# Patient Record
Sex: FEMALE | Race: WHITE | URBAN - METROPOLITAN AREA
[De-identification: names, ages, dates, MRNs, and addresses within clinical notes are randomized per-mention and may not be internally consistent; named-entity substitution may affect disease eponyms.]

---

## 2020-11-18 ENCOUNTER — OFFICE VISIT (OUTPATIENT)
Dept: URBAN - METROPOLITAN AREA CLINIC 11 | Facility: CLINIC | Age: 65
End: 2020-11-18
Payer: COMMERCIAL

## 2020-11-18 DIAGNOSIS — H25.13 AGE-RELATED NUCLEAR CATARACT, BILATERAL: ICD-10-CM

## 2020-11-18 DIAGNOSIS — E11.9 TYPE 2 DIABETES MELLITUS W/O COMPLICATION: ICD-10-CM

## 2020-11-18 DIAGNOSIS — H16.8 OTHER KERATITIS: Primary | ICD-10-CM

## 2020-11-18 PROCEDURE — 92004 COMPRE OPH EXAM NEW PT 1/>: CPT | Performed by: OPTOMETRIST

## 2020-11-18 ASSESSMENT — KERATOMETRY
OS: 41.88
OD: 42.50

## 2020-11-18 ASSESSMENT — INTRAOCULAR PRESSURE
OS: 15
OD: 15

## 2020-11-18 NOTE — IMPRESSION/PLAN
Impression: Type 2 diabetes mellitus w/o complication: O90.1. Plan: No signs of retinopathy or neovascularization noted. Discussed ocular and systemic benefits of blood sugar control. Continue blood sugar control.  RTC 1yr complete exam

## 2020-11-18 NOTE — IMPRESSION/PLAN
Impression: Age-related nuclear cataract, bilateral: H25.13. Plan: Discussed diagnosis in detail with patient. No treatment is required at this time. Will continue to observe condition and or symptoms. Call if [address] worsens.

## 2020-11-25 ENCOUNTER — OFFICE VISIT (OUTPATIENT)
Dept: URBAN - METROPOLITAN AREA CLINIC 11 | Facility: CLINIC | Age: 65
End: 2020-11-25
Payer: COMMERCIAL

## 2020-11-25 PROCEDURE — 99213 OFFICE O/P EST LOW 20 MIN: CPT | Performed by: OPTOMETRIST

## 2020-11-25 ASSESSMENT — INTRAOCULAR PRESSURE
OS: 15
OD: 15

## 2020-11-25 NOTE — IMPRESSION/PLAN
Impression: Other keratitis: H16.8. Right. Condition: improving. Plan: D/C Tobradex.  Start Rx Lotemax 1gtt QID OD x 1 wk, TID x 1 wk, BID  x 1 wk, QD x 1 wk.(sample dispensed today)  f/u 3-4 weeks

## 2020-12-23 ENCOUNTER — OFFICE VISIT (OUTPATIENT)
Dept: URBAN - METROPOLITAN AREA CLINIC 11 | Facility: CLINIC | Age: 65
End: 2020-12-23
Payer: COMMERCIAL

## 2020-12-23 PROCEDURE — 99213 OFFICE O/P EST LOW 20 MIN: CPT | Performed by: OPTOMETRIST

## 2020-12-23 ASSESSMENT — INTRAOCULAR PRESSURE
OD: 12
OS: 12

## 2020-12-23 NOTE — IMPRESSION/PLAN
Impression: Other keratitis: H16.8. Right. Condition: improving. Plan: finished Lotemax. Recommend art tears BID OU.  RTC 1 year DE CARMINE

## 2022-01-27 ENCOUNTER — OFFICE VISIT (OUTPATIENT)
Dept: URBAN - METROPOLITAN AREA CLINIC 11 | Facility: CLINIC | Age: 67
End: 2022-01-27
Payer: COMMERCIAL

## 2022-01-27 DIAGNOSIS — H40.033 ANATOMICAL NARROW ANGLE, BILATERAL: ICD-10-CM

## 2022-01-27 PROCEDURE — 92250 FUNDUS PHOTOGRAPHY W/I&R: CPT | Performed by: OPTOMETRIST

## 2022-01-27 PROCEDURE — 99214 OFFICE O/P EST MOD 30 MIN: CPT | Performed by: OPTOMETRIST

## 2022-01-27 ASSESSMENT — KERATOMETRY
OD: 41.75
OS: 42.13

## 2022-01-27 ASSESSMENT — INTRAOCULAR PRESSURE
OD: 14
OS: 14

## 2022-01-27 ASSESSMENT — VISUAL ACUITY
OD: 20/30
OS: 20/30

## 2022-01-27 NOTE — IMPRESSION/PLAN
Impression: Type 2 diabetes mellitus w/o complication: D55.8. Plan: Continue BS control. Pt not dilated today because of risk of angle closure.  RTC in 3mns for dilation after PI.

## 2022-01-27 NOTE — IMPRESSION/PLAN
Impression: Anatomical narrow angle, bilateral: H40.033. Plan: Photos of [address] 73 and performed today OU. Angles appear occludable OU.  Refer to MD for PI jdal. s/s of angle closure call office or go to ER

## 2022-01-27 NOTE — IMPRESSION/PLAN
Impression: Age-related nuclear cataract, bilateral: H25.13. Plan: Recommend Cataract evaluation after LPI OU. 
RTC in 3 months for dilation after PI.

## 2022-04-14 ENCOUNTER — OFFICE VISIT (OUTPATIENT)
Dept: URBAN - METROPOLITAN AREA CLINIC 45 | Facility: CLINIC | Age: 67
End: 2022-04-14
Payer: COMMERCIAL

## 2022-04-14 DIAGNOSIS — H40.033 ANATOMICAL NARROW ANGLE, BILATERAL: Primary | ICD-10-CM

## 2022-04-14 DIAGNOSIS — H25.13 AGE-RELATED NUCLEAR CATARACT, BILATERAL: ICD-10-CM

## 2022-04-14 PROCEDURE — 92133 CPTRZD OPH DX IMG PST SGM ON: CPT | Performed by: OPHTHALMOLOGY

## 2022-04-14 PROCEDURE — 99204 OFFICE O/P NEW MOD 45 MIN: CPT | Performed by: OPHTHALMOLOGY

## 2022-04-14 PROCEDURE — 92020 GONIOSCOPY: CPT | Performed by: OPHTHALMOLOGY

## 2022-04-14 PROCEDURE — 76514 ECHO EXAM OF EYE THICKNESS: CPT | Performed by: OPHTHALMOLOGY

## 2022-04-14 RX ORDER — PREDNISOLONE ACETATE 10 MG/ML
1 % SUSPENSION/ DROPS OPHTHALMIC
Qty: 1 | Refills: 0 | Status: ACTIVE
Start: 2022-04-14

## 2022-04-14 NOTE — IMPRESSION/PLAN
Impression: Anatomical narrow angle, bilateral: H40.033. /554, 4/22 OCT 91/95 9/10, GCC 78/79 Plan: ok for LPI OU in [address] 26, RL2. Discussed narrow angles, risk of angle closure, symptoms of AACG and Laser peripheral iridotomy (LPI) risk/benefits/alternatives. Discussed that pt should avoid dilation and anti-depression, anti-anxiety, anti-histamine, or other medications contraindicated in angle closure glaucoma until the laser has been performed. Discussed risk of irreversible vision loss with glaucoma. Pt voiced understanding.

## 2022-05-25 ENCOUNTER — SURGERY (OUTPATIENT)
Dept: URBAN - METROPOLITAN AREA SURGERY 20 | Facility: SURGERY | Age: 67
End: 2022-05-25
Payer: COMMERCIAL

## 2022-06-01 ENCOUNTER — POST-OPERATIVE VISIT (OUTPATIENT)
Dept: URBAN - METROPOLITAN AREA CLINIC 45 | Facility: CLINIC | Age: 67
End: 2022-06-01
Payer: COMMERCIAL

## 2022-06-01 DIAGNOSIS — Z48.810 ENCOUNTER FOR SURGICAL AFTERCARE FOLLOWING SURGERY ON A SENSE ORGAN: Primary | ICD-10-CM

## 2022-06-01 PROCEDURE — 99024 POSTOP FOLLOW-UP VISIT: CPT | Performed by: OPTOMETRIST

## 2022-06-01 ASSESSMENT — INTRAOCULAR PRESSURE
OD: 18
OS: 20

## 2022-06-01 NOTE — IMPRESSION/PLAN
Impression: S/P LPI (Laser Peripheral Iridotomy) OU - 7 Days. Encounter for surgical aftercare following surgery on a sense organ  Z48.810. Post operative instructions reviewed - Plan: return in 6 mos for dfe and cat eval --Advised patient to use artificial tears for comfort.

## 2023-01-31 ENCOUNTER — OFFICE VISIT (OUTPATIENT)
Dept: URBAN - METROPOLITAN AREA CLINIC 45 | Facility: CLINIC | Age: 68
End: 2023-01-31
Payer: COMMERCIAL

## 2023-01-31 DIAGNOSIS — H40.033 ANATOMICAL NARROW ANGLE, BILATERAL: ICD-10-CM

## 2023-01-31 DIAGNOSIS — E11.9 TYPE 2 DIABETES MELLITUS W/O COMPLICATION: ICD-10-CM

## 2023-01-31 DIAGNOSIS — H25.813 COMBINED FORMS OF AGE-RELATED CATARACT, BILATERAL: Primary | ICD-10-CM

## 2023-01-31 PROCEDURE — 92014 COMPRE OPH EXAM EST PT 1/>: CPT | Performed by: OPTOMETRIST

## 2023-01-31 ASSESSMENT — VISUAL ACUITY
OS: 20/25
OD: 20/25

## 2023-01-31 ASSESSMENT — INTRAOCULAR PRESSURE
OS: 21
OD: 20

## 2023-01-31 NOTE — IMPRESSION/PLAN
Impression: Type 2 diabetes mellitus w/o complication: R69.7. Plan: Diabetes type II: no background retinopathy, no signs of neovascularization noted. Discussed ocular and systemic benefits of blood sugar control.

## 2023-01-31 NOTE — IMPRESSION/PLAN
Impression: Combined forms of age-related cataract, bilateral: H25.813. Plan: Cataracts account for pt complaints. Do not recommend SX at this time. Will monitor for changes.

## 2024-01-31 ENCOUNTER — OFFICE VISIT (OUTPATIENT)
Dept: URBAN - METROPOLITAN AREA CLINIC 45 | Facility: CLINIC | Age: 69
End: 2024-01-31
Payer: COMMERCIAL

## 2024-01-31 DIAGNOSIS — H04.123 DRY EYE SYNDROME OF BILATERAL LACRIMAL GLANDS: ICD-10-CM

## 2024-01-31 DIAGNOSIS — H25.813 COMBINED FORMS OF AGE-RELATED CATARACT, BILATERAL: ICD-10-CM

## 2024-01-31 DIAGNOSIS — E11.9 TYPE 2 DIABETES MELLITUS WITHOUT COMPLICATIONS: Primary | ICD-10-CM

## 2024-01-31 DIAGNOSIS — H40.033 ANATOMICAL NARROW ANGLE, BILATERAL: ICD-10-CM

## 2024-01-31 PROCEDURE — 92014 COMPRE OPH EXAM EST PT 1/>: CPT | Performed by: OPTOMETRIST

## 2024-01-31 ASSESSMENT — INTRAOCULAR PRESSURE
OD: 18
OS: 18

## 2024-01-31 ASSESSMENT — VISUAL ACUITY
OD: 20/30
OS: 20/30

## 2024-01-31 ASSESSMENT — KERATOMETRY
OS: 42.13
OD: 42.25

## 2024-12-12 ENCOUNTER — OFFICE VISIT (OUTPATIENT)
Dept: URBAN - METROPOLITAN AREA CLINIC 45 | Facility: CLINIC | Age: 69
End: 2024-12-12
Payer: COMMERCIAL

## 2024-12-12 DIAGNOSIS — H04.123 DRY EYE SYNDROME OF BILATERAL LACRIMAL GLANDS: ICD-10-CM

## 2024-12-12 DIAGNOSIS — H40.033 ANATOMICAL NARROW ANGLE, BILATERAL: ICD-10-CM

## 2024-12-12 DIAGNOSIS — H25.813 COMBINED FORMS OF AGE-RELATED CATARACT, BILATERAL: ICD-10-CM

## 2024-12-12 DIAGNOSIS — H00.012 HORDEOLUM EXTERNUM RIGHT LOWER EYELID: ICD-10-CM

## 2024-12-12 DIAGNOSIS — E11.9 TYPE 2 DIABETES MELLITUS WITHOUT COMPLICATIONS: Primary | ICD-10-CM

## 2024-12-12 DIAGNOSIS — H43.812 VITREOUS DEGENERATION, LEFT EYE: ICD-10-CM

## 2024-12-12 DIAGNOSIS — H02.834 DERMATOCHALASIS OF LEFT UPPER EYELID: ICD-10-CM

## 2024-12-12 DIAGNOSIS — H02.831 DERMATOCHALASIS OF RIGHT UPPER EYELID: ICD-10-CM

## 2024-12-12 PROCEDURE — 92134 CPTRZ OPH DX IMG PST SGM RTA: CPT | Performed by: OPTOMETRIST

## 2024-12-12 PROCEDURE — 92014 COMPRE OPH EXAM EST PT 1/>: CPT | Performed by: OPTOMETRIST

## 2024-12-12 RX ORDER — NEOMYCIN SULFATE, POLYMYXIN B SULFATE AND DEXAMETHASONE 1; 3.5; 1 MG/G; MG/G; [USP'U]/G
OINTMENT OPHTHALMIC
Qty: 1 | Refills: 0 | Status: INACTIVE
Start: 2024-12-12 | End: 2025-01-10

## 2024-12-12 ASSESSMENT — VISUAL ACUITY
OS: 20/30
OD: 20/60

## 2024-12-12 ASSESSMENT — INTRAOCULAR PRESSURE
OD: 15
OS: 15

## 2024-12-27 ENCOUNTER — OFFICE VISIT (OUTPATIENT)
Dept: URBAN - METROPOLITAN AREA CLINIC 45 | Facility: CLINIC | Age: 69
End: 2024-12-27
Payer: COMMERCIAL

## 2024-12-27 DIAGNOSIS — H00.012 HORDEOLUM EXTERNUM RIGHT LOWER EYELID: Primary | ICD-10-CM

## 2024-12-27 PROCEDURE — 99213 OFFICE O/P EST LOW 20 MIN: CPT | Performed by: OPTOMETRIST

## 2024-12-27 ASSESSMENT — INTRAOCULAR PRESSURE
OS: 16
OD: 18